# Patient Record
Sex: FEMALE | Race: WHITE | NOT HISPANIC OR LATINO
[De-identification: names, ages, dates, MRNs, and addresses within clinical notes are randomized per-mention and may not be internally consistent; named-entity substitution may affect disease eponyms.]

---

## 2020-04-26 ENCOUNTER — MESSAGE (OUTPATIENT)
Age: 25
End: 2020-04-26

## 2020-04-30 LAB
SARS-COV-2 IGG SERPL IA-ACNC: <0.1 INDEX
SARS-COV-2 IGG SERPL QL IA: NEGATIVE

## 2020-07-27 ENCOUNTER — LABORATORY RESULT (OUTPATIENT)
Age: 25
End: 2020-07-27

## 2020-07-27 ENCOUNTER — TRANSCRIPTION ENCOUNTER (OUTPATIENT)
Age: 25
End: 2020-07-27

## 2020-07-27 ENCOUNTER — APPOINTMENT (OUTPATIENT)
Dept: PRIMARY CARE | Facility: HOSPITAL | Age: 25
End: 2020-07-27

## 2020-07-27 ENCOUNTER — OUTPATIENT (OUTPATIENT)
Dept: OUTPATIENT SERVICES | Facility: HOSPITAL | Age: 25
LOS: 1 days | End: 2020-07-27

## 2020-07-27 VITALS
WEIGHT: 159 LBS | HEIGHT: 63 IN | DIASTOLIC BLOOD PRESSURE: 73 MMHG | BODY MASS INDEX: 28.17 KG/M2 | SYSTOLIC BLOOD PRESSURE: 115 MMHG | TEMPERATURE: 98.2 F | HEART RATE: 74 BPM

## 2020-07-27 DIAGNOSIS — Z78.9 OTHER SPECIFIED HEALTH STATUS: ICD-10-CM

## 2020-07-27 DIAGNOSIS — Z02.0 ENCOUNTER FOR EXAMINATION FOR ADMISSION TO EDUCATIONAL INSTITUTION: ICD-10-CM

## 2020-07-27 DIAGNOSIS — L70.0 ACNE VULGARIS: ICD-10-CM

## 2020-07-27 DIAGNOSIS — E66.3 OVERWEIGHT: ICD-10-CM

## 2020-07-27 PROBLEM — Z00.00 ENCOUNTER FOR PREVENTIVE HEALTH EXAMINATION: Noted: 2020-07-27

## 2020-07-27 LAB
ANION GAP SERPL CALC-SCNC: 15 MMO/L — HIGH (ref 7–14)
BASOPHILS # BLD AUTO: 0.04 K/UL — SIGNIFICANT CHANGE UP (ref 0–0.2)
BASOPHILS NFR BLD AUTO: 0.7 % — SIGNIFICANT CHANGE UP (ref 0–2)
BUN SERPL-MCNC: 10 MG/DL — SIGNIFICANT CHANGE UP (ref 7–23)
CALCIUM SERPL-MCNC: 9.7 MG/DL — SIGNIFICANT CHANGE UP (ref 8.4–10.5)
CHLORIDE SERPL-SCNC: 101 MMOL/L — SIGNIFICANT CHANGE UP (ref 98–107)
CO2 SERPL-SCNC: 23 MMOL/L — SIGNIFICANT CHANGE UP (ref 22–31)
CREAT SERPL-MCNC: 0.78 MG/DL — SIGNIFICANT CHANGE UP (ref 0.5–1.3)
EOSINOPHIL # BLD AUTO: 0.03 K/UL — SIGNIFICANT CHANGE UP (ref 0–0.5)
EOSINOPHIL NFR BLD AUTO: 0.5 % — SIGNIFICANT CHANGE UP (ref 0–6)
GLUCOSE SERPL-MCNC: 72 MG/DL — SIGNIFICANT CHANGE UP (ref 70–99)
HCT VFR BLD CALC: 41.7 % — SIGNIFICANT CHANGE UP (ref 39–50)
HGB BLD-MCNC: 13.7 G/DL — SIGNIFICANT CHANGE UP (ref 13–17)
IMM GRANULOCYTES NFR BLD AUTO: 0.2 % — SIGNIFICANT CHANGE UP (ref 0–1.5)
LYMPHOCYTES # BLD AUTO: 2.4 K/UL — SIGNIFICANT CHANGE UP (ref 1–3.3)
LYMPHOCYTES # BLD AUTO: 43 % — SIGNIFICANT CHANGE UP (ref 13–44)
MCHC RBC-ENTMCNC: 31.7 PG — SIGNIFICANT CHANGE UP (ref 27–34)
MCHC RBC-ENTMCNC: 32.9 % — SIGNIFICANT CHANGE UP (ref 32–36)
MCV RBC AUTO: 96.5 FL — SIGNIFICANT CHANGE UP (ref 80–100)
MONOCYTES # BLD AUTO: 0.37 K/UL — SIGNIFICANT CHANGE UP (ref 0–0.9)
MONOCYTES NFR BLD AUTO: 6.6 % — SIGNIFICANT CHANGE UP (ref 2–14)
NEUTROPHILS # BLD AUTO: 2.73 K/UL — SIGNIFICANT CHANGE UP (ref 1.8–7.4)
NEUTROPHILS NFR BLD AUTO: 49 % — SIGNIFICANT CHANGE UP (ref 43–77)
NRBC # FLD: 0 K/UL — SIGNIFICANT CHANGE UP (ref 0–0)
PLATELET # BLD AUTO: 205 K/UL — SIGNIFICANT CHANGE UP (ref 150–400)
PMV BLD: 12 FL — SIGNIFICANT CHANGE UP (ref 7–13)
POTASSIUM SERPL-MCNC: 4.5 MMOL/L — SIGNIFICANT CHANGE UP (ref 3.5–5.3)
POTASSIUM SERPL-SCNC: 4.5 MMOL/L — SIGNIFICANT CHANGE UP (ref 3.5–5.3)
RBC # BLD: 4.32 M/UL — SIGNIFICANT CHANGE UP (ref 4.2–5.8)
RBC # FLD: 12.1 % — SIGNIFICANT CHANGE UP (ref 10.3–14.5)
SODIUM SERPL-SCNC: 139 MMOL/L — SIGNIFICANT CHANGE UP (ref 135–145)
WBC # BLD: 5.58 K/UL — SIGNIFICANT CHANGE UP (ref 3.8–10.5)
WBC # FLD AUTO: 5.58 K/UL — SIGNIFICANT CHANGE UP (ref 3.8–10.5)

## 2020-07-27 NOTE — END OF VISIT
[Time Spent: ___ minutes] : I have spent [unfilled] minutes of time on the encounter. [>50% of the face to face encounter time was spent on counseling and/or coordination of care for ___] : Greater than 50% of the face to face encounter time was spent on counseling and/or coordination of care for [unfilled] Composite Graft Text: The defect edges were debeveled with a #15 scalpel blade.  Given the location of the defect, shape of the defect, the proximity to free margins and the fact the defect was full thickness a composite graft was deemed most appropriate.  The defect was outline and then transferred to the donor site.  A full thickness graft was then excised from the donor site. The graft was then placed in the primary defect, oriented appropriately and then sutured into place.  The secondary defect was then repaired using a primary closure.

## 2020-07-27 NOTE — COUNSELING
[Potential consequences of obesity discussed] : Potential consequences of obesity discussed [Encouraged to maintain food diary] : Encouraged to maintain food diary [Benefits of weight loss discussed] : Benefits of weight loss discussed [Encouraged to use exercise tracking device] : Encouraged to use exercise tracking device [Encouraged to increase physical activity] : Encouraged to increase physical activity [Decrease Portions] : decrease portions [Weigh Self Weekly] : weigh self weekly [FreeTextEntry4] : 15

## 2020-07-27 NOTE — PHYSICAL EXAM
[No Acute Distress] : no acute distress [Well Nourished] : well nourished [Normal Outer Ear/Nose] : the outer ears and nose were normal in appearance [PERRL] : pupils equal round and reactive to light [Normal Sclera/Conjunctiva] : normal sclera/conjunctiva [Normal Oropharynx] : the oropharynx was normal [No Lymphadenopathy] : no lymphadenopathy [No Accessory Muscle Use] : no accessory muscle use [No Respiratory Distress] : no respiratory distress  [Normal Rate] : normal rate  [No Abdominal Bruit] : a ~M bruit was not heard ~T in the abdomen [Regular Rhythm] : with a regular rhythm [No Carotid Bruits] : no carotid bruits [Soft] : abdomen soft [Non Tender] : non-tender [No Spinal Tenderness] : no spinal tenderness [No CVA Tenderness] : no CVA  tenderness [Normal Affect] : the affect was normal [Normal Gait] : normal gait [No Joint Swelling] : no joint swelling

## 2020-07-27 NOTE — HISTORY OF PRESENT ILLNESS
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 She denies any cough, fever, sore throat or any COVID-19 related symptoms. No loss of smell or taste, no chest tightness or any respiratory distress. \par \par She takes her regular maintenance medication. Denies any chest pain, no chest palpitations or any respiratory distress.

## 2020-07-27 NOTE — HEALTH RISK ASSESSMENT
[Monthly or less (1 pt)] : Monthly or less (1 point) [Yes] : Yes [No falls in past year] : Patient reported no falls in the past year [0] : 2) Feeling down, depressed, or hopeless: Not at all (0) [de-identified] : normal activity  [de-identified] : regular diet

## 2020-07-28 LAB
GAMMA INTERFERON BACKGROUND BLD IA-ACNC: 0.05 IU/ML — SIGNIFICANT CHANGE UP
HBV SURFACE AB SER-ACNC: NONREACTIVE — SIGNIFICANT CHANGE UP
M TB IFN-G BLD-IMP: NEGATIVE — SIGNIFICANT CHANGE UP
M TB IFN-G CD4+ BCKGRND COR BLD-ACNC: -0.01 IU/ML — SIGNIFICANT CHANGE UP
M TB IFN-G CD4+CD8+ BCKGRND COR BLD-ACNC: -0.01 IU/ML — SIGNIFICANT CHANGE UP
MEV IGG SER-ACNC: 53.2 AU/ML — SIGNIFICANT CHANGE UP
MEV IGG+IGM SER-IMP: POSITIVE — SIGNIFICANT CHANGE UP
MUV AB SER-ACNC: NEGATIVE — SIGNIFICANT CHANGE UP
MUV IGG FLD-ACNC: <5 AU/ML — SIGNIFICANT CHANGE UP
QUANT TB PLUS MITOGEN MINUS NIL: 8.35 IU/ML — SIGNIFICANT CHANGE UP
RUBV IGG SER-ACNC: 0.3 INDEX — SIGNIFICANT CHANGE UP
RUBV IGG SER-IMP: NEGATIVE — SIGNIFICANT CHANGE UP
VZV IGG FLD QL IA: 67.5 INDEX — SIGNIFICANT CHANGE UP
VZV IGG FLD QL IA: NEGATIVE — SIGNIFICANT CHANGE UP

## 2021-11-17 ENCOUNTER — OUTPATIENT (OUTPATIENT)
Dept: OUTPATIENT SERVICES | Facility: HOSPITAL | Age: 26
LOS: 1 days | End: 2021-11-17

## 2021-11-17 ENCOUNTER — APPOINTMENT (OUTPATIENT)
Dept: PLASTIC SURGERY | Facility: CLINIC | Age: 26
End: 2021-11-17

## 2021-11-18 VITALS — WEIGHT: 140 LBS | BODY MASS INDEX: 24.8 KG/M2

## 2021-11-18 DIAGNOSIS — Z01.89 ENCOUNTER FOR OTHER SPECIFIED SPECIAL EXAMINATIONS: ICD-10-CM

## 2022-06-27 ENCOUNTER — APPOINTMENT (OUTPATIENT)
Dept: OPHTHALMOLOGY | Facility: CLINIC | Age: 27
End: 2022-06-27
Payer: COMMERCIAL

## 2022-06-27 ENCOUNTER — NON-APPOINTMENT (OUTPATIENT)
Age: 27
End: 2022-06-27

## 2022-06-27 PROCEDURE — 92002 INTRM OPH EXAM NEW PATIENT: CPT

## 2022-07-11 ENCOUNTER — RX RENEWAL (OUTPATIENT)
Age: 27
End: 2022-07-11

## 2022-07-19 ENCOUNTER — RESULT REVIEW (OUTPATIENT)
Age: 27
End: 2022-07-19

## 2022-07-28 ENCOUNTER — APPOINTMENT (OUTPATIENT)
Dept: HUMAN REPRODUCTION | Facility: CLINIC | Age: 27
End: 2022-07-28

## 2022-07-28 ENCOUNTER — APPOINTMENT (OUTPATIENT)
Dept: PLASTIC SURGERY | Facility: CLINIC | Age: 27
End: 2022-07-28

## 2022-07-28 ENCOUNTER — OUTPATIENT (OUTPATIENT)
Dept: OUTPATIENT SERVICES | Facility: HOSPITAL | Age: 27
LOS: 1 days | End: 2022-07-28

## 2022-07-28 PROCEDURE — 76830 TRANSVAGINAL US NON-OB: CPT

## 2022-07-28 PROCEDURE — 99204 OFFICE O/P NEW MOD 45 MIN: CPT | Mod: 25

## 2022-07-28 PROCEDURE — 36415 COLL VENOUS BLD VENIPUNCTURE: CPT

## 2022-07-28 NOTE — PROCEDURE
[FreeTextEntry1] : Facial aging, including rhytids of glabella and forehead. Desire for lip augmentation [FreeTextEntry2] : Injection of botox to face, injection of Juvederm Ultra XC filler, lips [FreeTextEntry6] : The patient is a good candidate for repeat injection of botox and hyaluronic  as above. Risks/benefits explained and informed consent obtained and signed. \par \par After sterile prep with alcohol and chlorhexidine, 0.8 mL of Juvederm Ultra XC was delivered to lips.\par \par 48 units Botox administered to glabella (28 u) and frontalis (20 u)\par \par Patient tolerated well. No immediate complications.

## 2022-09-12 ENCOUNTER — RESULT REVIEW (OUTPATIENT)
Age: 27
End: 2022-09-12

## 2023-01-06 ENCOUNTER — APPOINTMENT (OUTPATIENT)
Dept: HUMAN REPRODUCTION | Facility: CLINIC | Age: 28
End: 2023-01-06
Payer: COMMERCIAL

## 2023-01-06 PROCEDURE — 76830 TRANSVAGINAL US NON-OB: CPT

## 2023-01-06 PROCEDURE — 36415 COLL VENOUS BLD VENIPUNCTURE: CPT

## 2023-01-06 PROCEDURE — 99213 OFFICE O/P EST LOW 20 MIN: CPT | Mod: 25

## 2023-01-09 ENCOUNTER — APPOINTMENT (OUTPATIENT)
Dept: HUMAN REPRODUCTION | Facility: CLINIC | Age: 28
End: 2023-01-09
Payer: COMMERCIAL

## 2023-01-09 PROCEDURE — 36415 COLL VENOUS BLD VENIPUNCTURE: CPT

## 2023-01-09 PROCEDURE — 99213 OFFICE O/P EST LOW 20 MIN: CPT | Mod: 25

## 2023-01-09 PROCEDURE — 76857 US EXAM PELVIC LIMITED: CPT

## 2023-01-11 ENCOUNTER — APPOINTMENT (OUTPATIENT)
Dept: HUMAN REPRODUCTION | Facility: CLINIC | Age: 28
End: 2023-01-11
Payer: COMMERCIAL

## 2023-01-11 PROCEDURE — 99213 OFFICE O/P EST LOW 20 MIN: CPT | Mod: 25

## 2023-01-11 PROCEDURE — 76857 US EXAM PELVIC LIMITED: CPT

## 2023-01-11 PROCEDURE — 36415 COLL VENOUS BLD VENIPUNCTURE: CPT

## 2023-01-13 ENCOUNTER — APPOINTMENT (OUTPATIENT)
Dept: HUMAN REPRODUCTION | Facility: CLINIC | Age: 28
End: 2023-01-13
Payer: COMMERCIAL

## 2023-01-13 PROCEDURE — 99213 OFFICE O/P EST LOW 20 MIN: CPT | Mod: 25

## 2023-01-13 PROCEDURE — 36415 COLL VENOUS BLD VENIPUNCTURE: CPT

## 2023-01-13 PROCEDURE — 76830 TRANSVAGINAL US NON-OB: CPT

## 2023-01-15 ENCOUNTER — APPOINTMENT (OUTPATIENT)
Dept: HUMAN REPRODUCTION | Facility: CLINIC | Age: 28
End: 2023-01-15
Payer: COMMERCIAL

## 2023-01-15 PROCEDURE — 36415 COLL VENOUS BLD VENIPUNCTURE: CPT

## 2023-01-15 PROCEDURE — 99213 OFFICE O/P EST LOW 20 MIN: CPT | Mod: 25

## 2023-01-15 PROCEDURE — 76830 TRANSVAGINAL US NON-OB: CPT

## 2023-01-17 ENCOUNTER — APPOINTMENT (OUTPATIENT)
Dept: HUMAN REPRODUCTION | Facility: CLINIC | Age: 28
End: 2023-01-17
Payer: COMMERCIAL

## 2023-01-17 PROCEDURE — 76857 US EXAM PELVIC LIMITED: CPT

## 2023-01-17 PROCEDURE — 99213 OFFICE O/P EST LOW 20 MIN: CPT | Mod: 25

## 2023-01-17 PROCEDURE — 36415 COLL VENOUS BLD VENIPUNCTURE: CPT

## 2023-01-18 ENCOUNTER — APPOINTMENT (OUTPATIENT)
Dept: HUMAN REPRODUCTION | Facility: CLINIC | Age: 28
End: 2023-01-18
Payer: COMMERCIAL

## 2023-01-18 PROCEDURE — 36415 COLL VENOUS BLD VENIPUNCTURE: CPT

## 2023-01-19 ENCOUNTER — APPOINTMENT (OUTPATIENT)
Dept: HUMAN REPRODUCTION | Facility: CLINIC | Age: 28
End: 2023-01-19
Payer: COMMERCIAL

## 2023-01-19 PROCEDURE — 89346 STORAGE/YEAR OOCYTE(S): CPT

## 2023-01-19 PROCEDURE — 89337 CRYOPRESERVATION OOCYTE(S): CPT

## 2023-01-19 PROCEDURE — 76948 ECHO GUIDE OVA ASPIRATION: CPT

## 2023-01-19 PROCEDURE — 58970 RETRIEVAL OF OOCYTE: CPT

## 2023-01-19 PROCEDURE — 89254 OOCYTE IDENTIFICATION: CPT

## 2023-04-05 ENCOUNTER — APPOINTMENT (OUTPATIENT)
Dept: PLASTIC SURGERY | Facility: CLINIC | Age: 28
End: 2023-04-05

## 2023-04-05 ENCOUNTER — OUTPATIENT (OUTPATIENT)
Dept: OUTPATIENT SERVICES | Facility: HOSPITAL | Age: 28
LOS: 1 days | End: 2023-04-05

## 2023-04-05 NOTE — PROCEDURE
[FreeTextEntry1] : Facial aging, including rhytids of glabella and forehead [FreeTextEntry2] : Injection of botox to face [FreeTextEntry6] : The patient is a good candidate for repeat injection of botox as above. Risks/benefits explained and informed consent obtained and signed. \par \par After sterile prep with alcohol,\par \par 50 units Botox administered to glabella (28 u) frontalis (10 u) lateral orbicularis (6 u per side)\par \par Patient tolerated well. No immediate complications.

## 2023-04-06 DIAGNOSIS — Z48.812 ENCOUNTER FOR SURGICAL AFTERCARE FOLLOWING SURGERY ON THE CIRCULATORY SYSTEM: ICD-10-CM

## 2023-08-04 ENCOUNTER — APPOINTMENT (OUTPATIENT)
Dept: ULTRASOUND IMAGING | Facility: CLINIC | Age: 28
End: 2023-08-04

## 2023-08-04 ENCOUNTER — OUTPATIENT (OUTPATIENT)
Dept: OUTPATIENT SERVICES | Facility: HOSPITAL | Age: 28
LOS: 1 days | End: 2023-08-04
Payer: COMMERCIAL

## 2023-08-04 ENCOUNTER — APPOINTMENT (OUTPATIENT)
Dept: MAMMOGRAPHY | Facility: CLINIC | Age: 28
End: 2023-08-04

## 2023-08-04 DIAGNOSIS — Z00.8 ENCOUNTER FOR OTHER GENERAL EXAMINATION: ICD-10-CM

## 2023-08-04 PROCEDURE — 77066 DX MAMMO INCL CAD BI: CPT | Mod: 26

## 2023-08-04 PROCEDURE — 76641 ULTRASOUND BREAST COMPLETE: CPT | Mod: 26,50

## 2023-08-04 PROCEDURE — G0279: CPT

## 2023-08-04 PROCEDURE — 76641 ULTRASOUND BREAST COMPLETE: CPT

## 2023-08-04 PROCEDURE — 77066 DX MAMMO INCL CAD BI: CPT

## 2023-08-04 PROCEDURE — G0279: CPT | Mod: 26

## 2023-08-18 ENCOUNTER — OUTPATIENT (OUTPATIENT)
Dept: OUTPATIENT SERVICES | Facility: HOSPITAL | Age: 28
LOS: 1 days | End: 2023-08-18
Payer: COMMERCIAL

## 2023-08-18 ENCOUNTER — APPOINTMENT (OUTPATIENT)
Dept: ULTRASOUND IMAGING | Facility: CLINIC | Age: 28
End: 2023-08-18
Payer: COMMERCIAL

## 2023-08-18 DIAGNOSIS — Z00.8 ENCOUNTER FOR OTHER GENERAL EXAMINATION: ICD-10-CM

## 2023-08-18 PROCEDURE — 88305 TISSUE EXAM BY PATHOLOGIST: CPT

## 2023-08-18 PROCEDURE — 19083 BX BREAST 1ST LESION US IMAG: CPT

## 2023-08-18 PROCEDURE — 19083 BX BREAST 1ST LESION US IMAG: CPT | Mod: LT

## 2023-08-18 PROCEDURE — 88305 TISSUE EXAM BY PATHOLOGIST: CPT | Mod: 26

## 2023-08-18 PROCEDURE — 77065 DX MAMMO INCL CAD UNI: CPT

## 2023-08-18 PROCEDURE — 77065 DX MAMMO INCL CAD UNI: CPT | Mod: 26,LT

## 2023-08-22 LAB — SURGICAL PATHOLOGY STUDY: SIGNIFICANT CHANGE UP

## 2023-12-05 ENCOUNTER — TRANSCRIPTION ENCOUNTER (OUTPATIENT)
Age: 28
End: 2023-12-05

## 2023-12-07 ENCOUNTER — TRANSCRIPTION ENCOUNTER (OUTPATIENT)
Age: 28
End: 2023-12-07

## 2023-12-11 ENCOUNTER — APPOINTMENT (OUTPATIENT)
Dept: PRIMARY CARE | Facility: CLINIC | Age: 28
End: 2023-12-11

## 2023-12-11 ENCOUNTER — NON-APPOINTMENT (OUTPATIENT)
Age: 28
End: 2023-12-11

## 2023-12-11 ENCOUNTER — OUTPATIENT (OUTPATIENT)
Dept: OUTPATIENT SERVICES | Facility: HOSPITAL | Age: 28
LOS: 1 days | End: 2023-12-11

## 2023-12-14 ENCOUNTER — APPOINTMENT (OUTPATIENT)
Dept: PLASTIC SURGERY | Facility: CLINIC | Age: 28
End: 2023-12-14

## 2023-12-14 ENCOUNTER — OUTPATIENT (OUTPATIENT)
Dept: OUTPATIENT SERVICES | Facility: HOSPITAL | Age: 28
LOS: 1 days | End: 2023-12-14

## 2023-12-15 DIAGNOSIS — Z48.812 ENCOUNTER FOR SURGICAL AFTERCARE FOLLOWING SURGERY ON THE CIRCULATORY SYSTEM: ICD-10-CM

## 2023-12-20 NOTE — ASSESSMENT
[FreeTextEntry1] : History: LEILA AIKEN is a 28 year old female who presents in consultation for neurotoxin treatment of the forehead, glabella, and lateral brow. The patient is dissatisfied with the appearance of rhytids in the forehead and glabella and would like a slight arch to her lateral brow. The patient has previously received neurotoxin treatment in the past. Patient denies prior adverse reactions to neurotoxin. Denies current pregnancy. Additionally interested in lip filler today.  Exam: Dynamic rhytids of the glabella and forehead  25u to glabella 10u to forehead 4u to tail of brow bilaterally (2 / 2) 1cc Volbella total to upper and lower lip  A/P: LEILA AIKEN is a 28 year old female patient presenting for Botox treatment of the forehead, glabella, and lateral [place] regions. The risks of Botox were discussed with the patient including but not limited to asymmetry, bruising, pain at injection site, drooping eyelids, and headache. The risks of filler injection including bruising, infection, contour irregularity, and vascular occlusion leading to skin necrosis or blindness were similarly discussed. Informed consent was obtained, and treatment was administered as documented separately. The injections were well tolerated by the patient. she will follow up as needed for additional treatments.

## 2024-01-10 ENCOUNTER — TRANSCRIPTION ENCOUNTER (OUTPATIENT)
Age: 29
End: 2024-01-10

## 2024-02-14 ENCOUNTER — TRANSCRIPTION ENCOUNTER (OUTPATIENT)
Age: 29
End: 2024-02-14

## 2024-02-15 ENCOUNTER — APPOINTMENT (OUTPATIENT)
Dept: DERMATOLOGY | Facility: CLINIC | Age: 29
End: 2024-02-15
Payer: COMMERCIAL

## 2024-02-15 VITALS — BODY MASS INDEX: 24.8 KG/M2 | HEIGHT: 63 IN | WEIGHT: 140 LBS

## 2024-02-15 PROCEDURE — 17110 DESTRUCTION B9 LES UP TO 14: CPT

## 2024-02-15 PROCEDURE — 99203 OFFICE O/P NEW LOW 30 MIN: CPT | Mod: 25

## 2024-02-15 NOTE — PHYSICAL EXAM
[Alert] : alert [Oriented x 3] : ~L oriented x 3 [Well Nourished] : well nourished [Conjunctiva Non-injected] : conjunctiva non-injected [No Visual Lymphadenopathy] : no visual  lymphadenopathy [No Clubbing] : no clubbing [No Edema] : no edema [No Bromhidrosis] : no bromhidrosis [No Chromhidrosis] : no chromhidrosis [Face] : Face [FreeTextEntry3] : Problem focused exam requested. 1) On the R nasal sidewall are 2   irritated stuck-on appearing papules with pseudohorn cysts. 2) NL fold: atrophic pit

## 2024-02-15 NOTE — ASSESSMENT
[FreeTextEntry1] : # Seborrheic Keratoses, clinically inflamed Natural history and treatment options discussed. Recommend treatment with cryotherapy to which the patient is agreeable.  Cryotherapy Procedure Note. Lesion(s) treated: 2 , Location: R nasal sidewall After obtaining verbal consent, including counseling on risks of dyspigmentation and scarring, liquid nitrogen was applied to the above lesions. The patient tolerated the procedure well. Wound care was reviewed.  #Neoplasm of uncertain behavior - R NL fold chronic pitted scar with prior cystic acne history.  Since it has been symptomatic, I recommend removal with punch biopsy and sending for pathology for definitive diagnosis. She is in agreement with this plan. She would like to follow up for this procedure because she will be travelling for a ski trip this weekend and is unable to do the appropriate aftercare.   follow up PRN

## 2024-02-15 NOTE — HISTORY OF PRESENT ILLNESS
[FreeTextEntry1] : NPV- Scab/Devyn on Nose [de-identified] : Macarena Philip is a 29 y/o F presenting to the clinic for the above.  1) has had a persistent area of scabbing on her right nasal sidewall for years, more noticeable with makeup. She has picked at it and it falls off but grows back. She recently saw a new spot near the initial one as well.  2) Dark pitted scar near her R NL fold. Had a large acne lesion there years ago and it has healed like this. It has been itchy and symptomatic.

## 2024-02-29 ENCOUNTER — APPOINTMENT (OUTPATIENT)
Dept: DERMATOLOGY | Facility: CLINIC | Age: 29
End: 2024-02-29
Payer: COMMERCIAL

## 2024-02-29 DIAGNOSIS — D48.9 NEOPLASM OF UNCERTAIN BEHAVIOR, UNSPECIFIED: ICD-10-CM

## 2024-02-29 DIAGNOSIS — L82.0 INFLAMED SEBORRHEIC KERATOSIS: ICD-10-CM

## 2024-02-29 PROCEDURE — 11104 PUNCH BX SKIN SINGLE LESION: CPT

## 2024-02-29 PROCEDURE — 17110 DESTRUCTION B9 LES UP TO 14: CPT | Mod: 59

## 2024-02-29 NOTE — HISTORY OF PRESENT ILLNESS
[FreeTextEntry1] : RPV- planned procedure, lesion of concern [de-identified] : LEILA AIKEN is a 29yo F follow up for the above. LV with me 2/15/24.  1) Pitted lesion on right NL fold. Planned excisional biopsy.  2) Lesion on left axilla. Itches.  3)  Scaly dry patches on the right cheek and right nasal sidewall.

## 2024-02-29 NOTE — ASSESSMENT
[FreeTextEntry1] :  #Tumor of Uncertain Behavior - right NL fold Differential Diagnosis: acne scar.  Photographs obtained.  Recommended lesion removal by punch technique.  Reason: tumor of uncertain behavior.   Excision by 2mm Punch Technique with Simple Closure Procedure Note.  Lesion size: 0.1  cm.  After discussion of risks, written informed consent was obtained.  The area was cleaned with alcohol swab and a time out was performed.  Following intradermal injection of 1% lidocaine with 1:1000,000 epinephrine, a 2 mm excision by punch technique was performed down to the subcutaneous tissue. A simple closure was performed using 6-0 nylon interrupted skin sutures. Closure Length: 0.2 cm. A bandage was placed and wound care was reviewed with the patient. The specimen was sent to Pathology for review.  Estimated blood loss: Minimal. Complications: none. F/U 1 wk for suture removal  #Seborrheic Keratoses, clinically inflamed Natural history and treatment options discussed. Recommend treatment with cryotherapy to which the patient is agreeable.  Cryotherapy Procedure Note. Lesion(s) treated: 3 , Location: L axilla, R nasal sidewall, R cheek After obtaining verbal consent, including counseling on risks of dyspigmentation and scarring, liquid nitrogen was applied to the above lesions. The patient tolerated the procedure well. Wound care was reviewed.

## 2024-02-29 NOTE — PHYSICAL EXAM
[Alert] : alert [Oriented x 3] : ~L oriented x 3 [Well Nourished] : well nourished [Conjunctiva Non-injected] : conjunctiva non-injected [No Visual Lymphadenopathy] : no visual  lymphadenopathy [No Clubbing] : no clubbing [No Edema] : no edema [No Bromhidrosis] : no bromhidrosis [No Chromhidrosis] : no chromhidrosis [Face] : Face [L Arm] : L Arm [FreeTextEntry3] : 1) On the R nasal sidewall and right upper cheek are 2 irritated stuck-on appearing papules with pseudohorn cysts. 2) NL fold: atrophic pit.

## 2024-03-07 ENCOUNTER — APPOINTMENT (OUTPATIENT)
Dept: DERMATOLOGY | Facility: CLINIC | Age: 29
End: 2024-03-07
Payer: COMMERCIAL

## 2024-03-07 PROCEDURE — 99024 POSTOP FOLLOW-UP VISIT: CPT

## 2024-03-20 LAB — DERMATOLOGY BIOPSY: NORMAL

## 2024-03-23 ENCOUNTER — OUTPATIENT (OUTPATIENT)
Dept: OUTPATIENT SERVICES | Facility: HOSPITAL | Age: 29
LOS: 1 days | End: 2024-03-23
Payer: COMMERCIAL

## 2024-03-23 ENCOUNTER — APPOINTMENT (OUTPATIENT)
Dept: ULTRASOUND IMAGING | Facility: CLINIC | Age: 29
End: 2024-03-23
Payer: COMMERCIAL

## 2024-03-23 DIAGNOSIS — R92.323 MAMMOGRAPHIC FIBROGLANDULAR DENSITY, BILATERAL BREASTS: ICD-10-CM

## 2024-03-23 PROCEDURE — 76642 ULTRASOUND BREAST LIMITED: CPT

## 2024-03-23 PROCEDURE — 76642 ULTRASOUND BREAST LIMITED: CPT | Mod: 26,LT

## 2024-03-25 ENCOUNTER — NON-APPOINTMENT (OUTPATIENT)
Age: 29
End: 2024-03-25

## 2024-04-09 ENCOUNTER — APPOINTMENT (OUTPATIENT)
Dept: INTERNAL MEDICINE | Facility: CLINIC | Age: 29
End: 2024-04-09
Payer: COMMERCIAL

## 2024-04-09 ENCOUNTER — NON-APPOINTMENT (OUTPATIENT)
Age: 29
End: 2024-04-09

## 2024-04-09 VITALS
SYSTOLIC BLOOD PRESSURE: 105 MMHG | HEART RATE: 77 BPM | TEMPERATURE: 98 F | WEIGHT: 139 LBS | BODY MASS INDEX: 24.63 KG/M2 | DIASTOLIC BLOOD PRESSURE: 67 MMHG | OXYGEN SATURATION: 98 % | HEIGHT: 63 IN

## 2024-04-09 DIAGNOSIS — E66.3 OVERWEIGHT: ICD-10-CM

## 2024-04-09 DIAGNOSIS — Z56.6 OTHER PHYSICAL AND MENTAL STRAIN RELATED TO WORK: ICD-10-CM

## 2024-04-09 DIAGNOSIS — Z00.00 ENCOUNTER FOR GENERAL ADULT MEDICAL EXAMINATION W/OUT ABNORMAL FINDINGS: ICD-10-CM

## 2024-04-09 PROCEDURE — 36415 COLL VENOUS BLD VENIPUNCTURE: CPT

## 2024-04-09 PROCEDURE — 99385 PREV VISIT NEW AGE 18-39: CPT

## 2024-04-09 PROCEDURE — G0444 DEPRESSION SCREEN ANNUAL: CPT | Mod: 59

## 2024-04-09 RX ORDER — SPIRONOLACTONE 50 MG/1
TABLET ORAL
Refills: 0 | Status: DISCONTINUED | COMMUNITY
End: 2024-04-09

## 2024-04-09 RX ORDER — NORETHINDRONE ACETATE AND ETHINYL ESTRADIOL 1.5; 3 MG/1; UG/1
TABLET ORAL
Refills: 0 | Status: DISCONTINUED | COMMUNITY
End: 2024-04-09

## 2024-04-09 RX ORDER — SEMAGLUTIDE 1.7 MG/.75ML
1.7 INJECTION, SOLUTION SUBCUTANEOUS
Refills: 0 | Status: ACTIVE | COMMUNITY
Start: 2024-04-09

## 2024-04-09 SDOH — HEALTH STABILITY - MENTAL HEALTH: OTHER PHYSICAL AND MENTAL STRAIN RELATED TO WORK: Z56.6

## 2024-04-15 LAB
ALBUMIN SERPL ELPH-MCNC: 4.4 G/DL
ALP BLD-CCNC: 36 U/L
ALT SERPL-CCNC: 9 U/L
ANION GAP SERPL CALC-SCNC: 15 MMOL/L
AST SERPL-CCNC: 13 U/L
BASOPHILS # BLD AUTO: 0.04 K/UL
BASOPHILS NFR BLD AUTO: 1 %
BILIRUB SERPL-MCNC: 0.5 MG/DL
BUN SERPL-MCNC: 9 MG/DL
C TRACH RRNA SPEC QL NAA+PROBE: NOT DETECTED
CALCIUM SERPL-MCNC: 9 MG/DL
CHLORIDE SERPL-SCNC: 103 MMOL/L
CHOLEST SERPL-MCNC: 173 MG/DL
CO2 SERPL-SCNC: 20 MMOL/L
CREAT SERPL-MCNC: 0.72 MG/DL
EGFR: 115 ML/MIN/1.73M2
EOSINOPHIL # BLD AUTO: 0.04 K/UL
EOSINOPHIL NFR BLD AUTO: 1 %
ESTIMATED AVERAGE GLUCOSE: 88 MG/DL
GLUCOSE SERPL-MCNC: 78 MG/DL
HBA1C MFR BLD HPLC: 4.7 %
HCG SERPL-MCNC: <1 MIU/ML
HCT VFR BLD CALC: 37.8 %
HDLC SERPL-MCNC: 62 MG/DL
HGB BLD-MCNC: 12.6 G/DL
HIV1+2 AB SPEC QL IA.RAPID: NONREACTIVE
IMM GRANULOCYTES NFR BLD AUTO: 0.3 %
LDLC SERPL CALC-MCNC: 101 MG/DL
LYMPHOCYTES # BLD AUTO: 2.03 K/UL
LYMPHOCYTES NFR BLD AUTO: 52.1 %
MAN DIFF?: NORMAL
MCHC RBC-ENTMCNC: 32.1 PG
MCHC RBC-ENTMCNC: 33.3 GM/DL
MCV RBC AUTO: 96.2 FL
MONOCYTES # BLD AUTO: 0.31 K/UL
MONOCYTES NFR BLD AUTO: 7.9 %
N GONORRHOEA RRNA SPEC QL NAA+PROBE: NOT DETECTED
NEUTROPHILS # BLD AUTO: 1.47 K/UL
NEUTROPHILS NFR BLD AUTO: 37.7 %
NONHDLC SERPL-MCNC: 111 MG/DL
PLATELET # BLD AUTO: 161 K/UL
POTASSIUM SERPL-SCNC: 4.1 MMOL/L
PROT SERPL-MCNC: 6.9 G/DL
RBC # BLD: 3.93 M/UL
RBC # FLD: 12.7 %
SODIUM SERPL-SCNC: 138 MMOL/L
SOURCE AMPLIFICATION: NORMAL
TRIGL SERPL-MCNC: 49 MG/DL
TSH SERPL-ACNC: 2.9 UIU/ML
WBC # FLD AUTO: 3.9 K/UL

## 2024-04-16 DIAGNOSIS — R41.840 ATTENTION AND CONCENTRATION DEFICIT: ICD-10-CM

## 2024-05-15 ENCOUNTER — APPOINTMENT (OUTPATIENT)
Dept: DERMATOLOGY | Facility: CLINIC | Age: 29
End: 2024-05-15
Payer: COMMERCIAL

## 2024-05-15 DIAGNOSIS — L81.4 OTHER MELANIN HYPERPIGMENTATION: ICD-10-CM

## 2024-05-15 DIAGNOSIS — Z12.83 ENCOUNTER FOR SCREENING FOR MALIGNANT NEOPLASM OF SKIN: ICD-10-CM

## 2024-05-15 DIAGNOSIS — L70.8 OTHER ACNE: ICD-10-CM

## 2024-05-15 DIAGNOSIS — L70.0 ACNE VULGARIS: ICD-10-CM

## 2024-05-15 DIAGNOSIS — D22.9 MELANOCYTIC NEVI, UNSPECIFIED: ICD-10-CM

## 2024-05-15 PROCEDURE — 99214 OFFICE O/P EST MOD 30 MIN: CPT

## 2024-05-15 RX ORDER — TRETINOIN 0.25 MG/G
0.03 CREAM TOPICAL
Qty: 1 | Refills: 1 | Status: ACTIVE | COMMUNITY
Start: 2024-05-15 | End: 1900-01-01

## 2024-05-15 NOTE — PHYSICAL EXAM
[FreeTextEntry3] : Exam of external genitalia was deferred.  -On the trunk and upper/lower extremities bilaterally, are multiple scattered tan to brown macules and papules with regular borders. Some of these were examined dermoscopically and had reassuring features. -Scattered tan smooth macules with regular borders and pigmentation. Many of these were examined with dermoscopy and had benign features. - right NL fold- smaller dilated pore, compared to prior photo - forehead- comedones

## 2024-05-15 NOTE — ASSESSMENT
[FreeTextEntry1] : #Skin cancer screening  Sun protection reviewed. The patient was educated regarding appropriate sun protection measures, including wearing sunscreen with SPF 30 or higher when outdoors, sun protective clothing, and sun avoidance.    #Benign appearing nevi #Solar lentigines Patient was reassured of the benign nature of these findings. No further treatment needed at this time. If any lesion changes or becomes symptomatic I recommend follow-up  # Dilated pore of beti- recurrence -recommend TCA crss. explained will be self pay. risk and benefits discussed.  -she will think about it to decide  # History of cystic acne -tret 0.025% cream TIW at night. SED including skin irritation and peeling. Discontinue if pregnant or breastfeeding. Proper application technique reviewed.  RTC annually or sooner prn

## 2024-05-15 NOTE — HISTORY OF PRESENT ILLNESS
[FreeTextEntry1] : RPV- skin check [de-identified] : LEILA AIKEN 27yo F presents for FBSE.   -Dilated pore came back after punch excision.  -Scaly patches on R nose resolved following LN2  - interested in retin-A, history of acne on face PMH -no history of skin cancer  FHx  -No family history of melanoma

## 2024-07-08 DIAGNOSIS — E78.00 PURE HYPERCHOLESTEROLEMIA, UNSPECIFIED: ICD-10-CM

## 2024-07-31 ENCOUNTER — TRANSCRIPTION ENCOUNTER (OUTPATIENT)
Age: 29
End: 2024-07-31

## 2024-09-27 ENCOUNTER — OUTPATIENT (OUTPATIENT)
Dept: OUTPATIENT SERVICES | Facility: HOSPITAL | Age: 29
LOS: 1 days | End: 2024-09-27
Payer: COMMERCIAL

## 2024-09-27 ENCOUNTER — APPOINTMENT (OUTPATIENT)
Dept: ULTRASOUND IMAGING | Facility: CLINIC | Age: 29
End: 2024-09-27

## 2024-09-27 DIAGNOSIS — N63.20 UNSPECIFIED LUMP IN THE LEFT BREAST, UNSPECIFIED QUADRANT: ICD-10-CM

## 2024-09-27 DIAGNOSIS — N63.10 UNSPECIFIED LUMP IN THE RIGHT BREAST, UNSPECIFIED QUADRANT: ICD-10-CM

## 2024-09-27 PROCEDURE — 76642 ULTRASOUND BREAST LIMITED: CPT

## 2024-09-27 PROCEDURE — 76642 ULTRASOUND BREAST LIMITED: CPT | Mod: 26,LT

## 2025-02-03 ENCOUNTER — TRANSCRIPTION ENCOUNTER (OUTPATIENT)
Age: 30
End: 2025-02-03

## 2025-02-26 ENCOUNTER — TRANSCRIPTION ENCOUNTER (OUTPATIENT)
Age: 30
End: 2025-02-26

## 2025-03-21 ENCOUNTER — NON-APPOINTMENT (OUTPATIENT)
Age: 30
End: 2025-03-21

## 2025-03-31 ENCOUNTER — APPOINTMENT (OUTPATIENT)
Dept: BARIATRICS/WEIGHT MGMT | Facility: CLINIC | Age: 30
End: 2025-03-31

## 2025-06-25 ENCOUNTER — APPOINTMENT (OUTPATIENT)
Dept: ULTRASOUND IMAGING | Facility: CLINIC | Age: 30
End: 2025-06-25
Payer: COMMERCIAL

## 2025-06-25 ENCOUNTER — OUTPATIENT (OUTPATIENT)
Dept: OUTPATIENT SERVICES | Facility: HOSPITAL | Age: 30
LOS: 1 days | End: 2025-06-25
Payer: COMMERCIAL

## 2025-06-25 DIAGNOSIS — N63.20 UNSPECIFIED LUMP IN THE LEFT BREAST, UNSPECIFIED QUADRANT: ICD-10-CM

## 2025-06-25 PROCEDURE — 76642 ULTRASOUND BREAST LIMITED: CPT | Mod: 26,LT

## 2025-06-25 PROCEDURE — 76642 ULTRASOUND BREAST LIMITED: CPT

## 2025-08-08 ENCOUNTER — NON-APPOINTMENT (OUTPATIENT)
Age: 30
End: 2025-08-08

## 2025-08-08 ENCOUNTER — APPOINTMENT (OUTPATIENT)
Dept: DERMATOLOGY | Facility: CLINIC | Age: 30
End: 2025-08-08
Payer: COMMERCIAL

## 2025-08-08 DIAGNOSIS — L70.8 OTHER ACNE: ICD-10-CM

## 2025-08-08 DIAGNOSIS — L81.4 OTHER MELANIN HYPERPIGMENTATION: ICD-10-CM

## 2025-08-08 DIAGNOSIS — Z12.83 ENCOUNTER FOR SCREENING FOR MALIGNANT NEOPLASM OF SKIN: ICD-10-CM

## 2025-08-08 DIAGNOSIS — L82.0 INFLAMED SEBORRHEIC KERATOSIS: ICD-10-CM

## 2025-08-08 DIAGNOSIS — D48.9 NEOPLASM OF UNCERTAIN BEHAVIOR, UNSPECIFIED: ICD-10-CM

## 2025-08-08 PROCEDURE — 11102 TANGNTL BX SKIN SINGLE LES: CPT

## 2025-08-08 PROCEDURE — 99214 OFFICE O/P EST MOD 30 MIN: CPT | Mod: 25

## 2025-08-15 LAB — DERMATOLOGY BIOPSY: NORMAL

## 2025-09-07 ENCOUNTER — NON-APPOINTMENT (OUTPATIENT)
Age: 30
End: 2025-09-07